# Patient Record
Sex: MALE | Race: ASIAN | NOT HISPANIC OR LATINO | URBAN - METROPOLITAN AREA
[De-identification: names, ages, dates, MRNs, and addresses within clinical notes are randomized per-mention and may not be internally consistent; named-entity substitution may affect disease eponyms.]

---

## 2020-06-17 ENCOUNTER — OFFICE VISIT - HEALTHEAST (OUTPATIENT)
Dept: FAMILY MEDICINE | Facility: CLINIC | Age: 20
End: 2020-06-17

## 2020-06-17 DIAGNOSIS — Z00.00 ENCOUNTER FOR ANNUAL PHYSICAL EXAM: ICD-10-CM

## 2020-06-17 DIAGNOSIS — F32.0 MILD MAJOR DEPRESSION (H): ICD-10-CM

## 2020-06-17 DIAGNOSIS — Z01.818 ENCOUNTER FOR PREOPERATIVE EXAMINATION FOR GENERAL SURGICAL PROCEDURE: ICD-10-CM

## 2020-06-17 DIAGNOSIS — Z11.1 SCREENING FOR TUBERCULOSIS: ICD-10-CM

## 2020-06-17 DIAGNOSIS — D68.00 VON WILLEBRAND DISEASE (H): ICD-10-CM

## 2020-06-17 LAB
ALBUMIN SERPL-MCNC: 4.4 G/DL (ref 3.5–5)
ALP SERPL-CCNC: 110 U/L (ref 45–120)
ALT SERPL W P-5'-P-CCNC: 37 U/L (ref 0–45)
ANION GAP SERPL CALCULATED.3IONS-SCNC: 12 MMOL/L (ref 5–18)
AST SERPL W P-5'-P-CCNC: 24 U/L (ref 0–40)
BASOPHILS # BLD AUTO: 0 THOU/UL (ref 0–0.2)
BASOPHILS NFR BLD AUTO: 1 % (ref 0–2)
BILIRUB SERPL-MCNC: 0.7 MG/DL (ref 0–1)
BUN SERPL-MCNC: 9 MG/DL (ref 8–22)
CALCIUM SERPL-MCNC: 9.4 MG/DL (ref 8.5–10.5)
CHLORIDE BLD-SCNC: 101 MMOL/L (ref 98–107)
CHOLEST SERPL-MCNC: 202 MG/DL
CO2 SERPL-SCNC: 27 MMOL/L (ref 22–31)
CREAT SERPL-MCNC: 0.82 MG/DL (ref 0.7–1.3)
EOSINOPHIL # BLD AUTO: 0.1 THOU/UL (ref 0–0.4)
EOSINOPHIL NFR BLD AUTO: 3 % (ref 0–6)
ERYTHROCYTE [DISTWIDTH] IN BLOOD BY AUTOMATED COUNT: 11.4 % (ref 11–14.5)
FASTING STATUS PATIENT QL REPORTED: YES
GFR SERPL CREATININE-BSD FRML MDRD: >60 ML/MIN/1.73M2
GLUCOSE BLD-MCNC: 88 MG/DL (ref 70–125)
HCT VFR BLD AUTO: 46.2 % (ref 40–54)
HDLC SERPL-MCNC: 60 MG/DL
HGB BLD-MCNC: 16 G/DL (ref 14–18)
HIV 1+2 AB+HIV1 P24 AG SERPL QL IA: NEGATIVE
LDLC SERPL CALC-MCNC: 126 MG/DL
LYMPHOCYTES # BLD AUTO: 3 THOU/UL (ref 0.8–4.4)
LYMPHOCYTES NFR BLD AUTO: 53 % (ref 20–40)
MCH RBC QN AUTO: 33.2 PG (ref 27–34)
MCHC RBC AUTO-ENTMCNC: 34.6 G/DL (ref 32–36)
MCV RBC AUTO: 96 FL (ref 80–100)
MONOCYTES # BLD AUTO: 0.3 THOU/UL (ref 0–0.9)
MONOCYTES NFR BLD AUTO: 5 % (ref 2–10)
NEUTROPHILS # BLD AUTO: 2.2 THOU/UL (ref 2–7.7)
NEUTROPHILS NFR BLD AUTO: 39 % (ref 50–70)
PLATELET # BLD AUTO: 161 THOU/UL (ref 140–440)
PMV BLD AUTO: 8.1 FL (ref 7–10)
POTASSIUM BLD-SCNC: 4 MMOL/L (ref 3.5–5)
PROT SERPL-MCNC: 7.2 G/DL (ref 6–8)
RBC # BLD AUTO: 4.82 MILL/UL (ref 4.4–6.2)
SODIUM SERPL-SCNC: 140 MMOL/L (ref 136–145)
TRIGL SERPL-MCNC: 79 MG/DL
WBC: 5.7 THOU/UL (ref 4–11)

## 2020-06-17 ASSESSMENT — ANXIETY QUESTIONNAIRES
1. FEELING NERVOUS, ANXIOUS, OR ON EDGE: SEVERAL DAYS
5. BEING SO RESTLESS THAT IT IS HARD TO SIT STILL: NOT AT ALL
GAD7 TOTAL SCORE: 4
7. FEELING AFRAID AS IF SOMETHING AWFUL MIGHT HAPPEN: SEVERAL DAYS
6. BECOMING EASILY ANNOYED OR IRRITABLE: SEVERAL DAYS
IF YOU CHECKED OFF ANY PROBLEMS ON THIS QUESTIONNAIRE, HOW DIFFICULT HAVE THESE PROBLEMS MADE IT FOR YOU TO DO YOUR WORK, TAKE CARE OF THINGS AT HOME, OR GET ALONG WITH OTHER PEOPLE: NOT DIFFICULT AT ALL
2. NOT BEING ABLE TO STOP OR CONTROL WORRYING: NOT AT ALL
3. WORRYING TOO MUCH ABOUT DIFFERENT THINGS: SEVERAL DAYS
4. TROUBLE RELAXING: NOT AT ALL

## 2020-06-17 ASSESSMENT — PATIENT HEALTH QUESTIONNAIRE - PHQ9: SUM OF ALL RESPONSES TO PHQ QUESTIONS 1-9: 8

## 2020-06-17 ASSESSMENT — MIFFLIN-ST. JEOR: SCORE: 1785.52

## 2020-06-19 LAB
GAMMA INTERFERON BACKGROUND BLD IA-ACNC: 0.02 IU/ML
M TB IFN-G BLD-IMP: NEGATIVE
MITOGEN IGNF BCKGRD COR BLD-ACNC: -0.01 IU/ML
MITOGEN IGNF BCKGRD COR BLD-ACNC: 0 IU/ML
QTF INTERPRETATION: NORMAL
QTF MITOGEN - NIL: 9.58 IU/ML

## 2020-06-22 ENCOUNTER — COMMUNICATION - HEALTHEAST (OUTPATIENT)
Dept: FAMILY MEDICINE | Facility: CLINIC | Age: 20
End: 2020-06-22

## 2020-06-22 ENCOUNTER — COMMUNICATION - HEALTHEAST (OUTPATIENT)
Dept: SCHEDULING | Facility: CLINIC | Age: 20
End: 2020-06-22

## 2021-05-27 ASSESSMENT — PATIENT HEALTH QUESTIONNAIRE - PHQ9: SUM OF ALL RESPONSES TO PHQ QUESTIONS 1-9: 8

## 2021-05-28 ASSESSMENT — ANXIETY QUESTIONNAIRES: GAD7 TOTAL SCORE: 4

## 2021-06-04 VITALS
SYSTOLIC BLOOD PRESSURE: 111 MMHG | HEIGHT: 73 IN | DIASTOLIC BLOOD PRESSURE: 79 MMHG | HEART RATE: 73 BPM | WEIGHT: 162.4 LBS | BODY MASS INDEX: 21.52 KG/M2 | TEMPERATURE: 97.5 F | OXYGEN SATURATION: 98 %

## 2021-06-08 NOTE — PROGRESS NOTES
Preoperative Exam    Scheduled Procedure: Bilateral ICL eye surgery   Surgery Date:  06/30/2020  Surgery Location: [facility]- FAX# [phone]    Surgeon:  Dr. Neves     Assessment/Plan:     Benton was seen today for pre-op exam.    Encounter for preoperative examination for general surgical procedure  Clear for surgery  -     Comprehensive Metabolic Panel  -     HM1(CBC and Differential)  -     HM1 (CBC with Diff)      PROBLEMS ADDRESSED TODAY THAT ARE UNRELATED TO SURGERY:  Encounter for annual physical exam  Updated immunization. Fasting lab work done.   -     Comprehensive Metabolic Panel  -     Lipid [place] FASTING  -     HIV Antigen/Antibody Screening [place]    Von Willebrand disease (H)  H/o nosebleds and easy bruising. Hasn't been an issue for him in the last few years  -     HM1(CBC and Differential)  -     HM1 (CBC with Diff)    Mild major depression (H)  ADIEL-7 score is 4 andPHQ-9 score is 8. Was on wellbutrin and prozac previously when he was in high school. He feels good now. Not on any medication currently. Continue to monitor    Screening for tuberculosis  Needed for school.   -     QTF-Mycobacterium tuberculosis by QuantiFERON-TB Gold Plus    Other orders  -     Tdap vaccine,  8yo or older,  IM      Surgical Procedure Risk: Low (reported cardiac risk generally < 1%)  Have you had prior anesthesia?: Yes  Have you or any family members had a previous anesthesia reaction:  No  Do you or any family members have a history of a clotting or bleeding disorder?: Yes: patient has VonWillebrand disease. Has had major ankle surgery a few years ago without any issue.   Cardiac Risk Assessment: no increased risk for major cardiac complications    APPROVAL GIVEN to proceed with proposed procedure, without further diagnostic evaluation      Functional Status: Independent  Patient plans to recover at home with family.     Subjective:      Benton Starr is a 20 y.o. male who presents for a preoperative  consultation.  With be undergoing ICL surgery bilateral eyes. No concern. Has been feeling well. Patient is new to me and is also here for annual physical prior to going back to school so will need immunization updated and lab work done.   Has had vonwillebrand disease and undergone ankle surgery without complications. Had easy bruising and nosebleeds when he was younger but hasn't had bleeding issue for the last few years.     All other systems reviewed and are negative, other than those listed in the HPI.    Pertinent History  Do you have difficulty breathing or chest pain after walking up a flight of stairs: No  History of obstructive sleep apnea: No  Steroid use in the last 6 months: No  Frequent Aspirin/NSAID use: No  Prior Blood Transfusion: Yes: Von willebrand disease.   Prior Blood Transfusion Reaction: No  If for some reason prior to, during or after the procedure, if it is medically indicated, would you be willing to have a blood transfusion?:  There is no transfusion refusal.    No current outpatient medications on file.     No current facility-administered medications for this visit.         Allergies   Allergen Reactions     Amoxicillin        Patient Active Problem List   Diagnosis     Von Willebrand disease (H)     Mild major depression (H)       Past Medical History:   Diagnosis Date     Von Willebrand disease (H)        Past Surgical History:   Procedure Laterality Date     ANKLE SURGERY Left        Social History     Socioeconomic History     Marital status: Single     Spouse name: Not on file     Number of children: Not on file     Years of education: Not on file     Highest education level: Not on file   Occupational History     Not on file   Social Needs     Financial resource strain: Not on file     Food insecurity     Worry: Not on file     Inability: Not on file     Transportation needs     Medical: Not on file     Non-medical: Not on file   Tobacco Use     Smoking status: Former Smoker      "Smokeless tobacco: Current User     Tobacco comment: vaping   Substance and Sexual Activity     Alcohol use: Yes     Frequency: Monthly or less     Drug use: Not Currently     Sexual activity: Yes     Partners: Female     Birth control/protection: Condom   Lifestyle     Physical activity     Days per week: Not on file     Minutes per session: Not on file     Stress: Not on file   Relationships     Social connections     Talks on phone: Not on file     Gets together: Not on file     Attends Tenriism service: Not on file     Active member of club or organization: Not on file     Attends meetings of clubs or organizations: Not on file     Relationship status: Not on file     Intimate partner violence     Fear of current or ex partner: Not on file     Emotionally abused: Not on file     Physically abused: Not on file     Forced sexual activity: Not on file   Other Topics Concern     Not on file   Social History Narrative    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     Adopted from [place]. Has 5 siblings in total.        Patient Care Team:  Dotty Oseguera MD as PCP - General (Family Medicine)      Objective:     Vitals:    06/17/20 0935   BP: 111/79   Pulse: 73   Temp: 97.5  F (36.4  C)   TempSrc: Oral   SpO2: 98%   Weight: 162 lb 6.4 oz (73.7 kg)   Height: 6' 1\" (1.854 m)       Physical Exam:  General Appearance: Alert, cooperative, no distress, appears stated age  Head: Normocephalic, without obvious abnormality, atraumatic  Eyes: PERRL, conjunctiva/corneas clear, EOM's intact  Neck: Supple, symmetrical, trachea midline, no adenopathy;  thyroid: not enlarged, symmetric, no tenderness/mass/nodules  Back: Symmetric, no curvature, ROM normal  Lungs: Clear to auscultation bilaterally, respirations unlabored  Heart: Regular rate and rhythm, S1 and S2 normal, no murmur rub or gallop  Abdomen: Soft, non-tender, bowel sounds active all four quadrants,  no masses, no organomegaly  Genitourinary: " Not performed  Musculoskeletal: Normal range of motion.    Extremities: Extremities normal, atraumatic, no cyanosis,   Skin: Limited skin examination is unremarkable  Lymph nodes: Cervical, supraclavicular, and axillary nodes normal  Neurologic: alert, has normal reflexes.  answers questions appropriately, sensation intact throughout.   Psychiatric: normal mood and affect.         Patient Instructions      Before Your Surgery       Call your surgeon if there is any change in your health. This includes signs of a cold or flu (such as a sore throat, runny nose, cough, rash or fever).       Do not smoke, drink alcohol or take over the counter medicine (unless your surgeon or primary care doctor tells you to) for the 24 hours before and after surgery.       If you take prescribed drugs: Follow your doctor s orders about which medicines to take and which to stop until after surgery.      Eating and drinking prior to surgery: follow the instructions from your surgeon.      Take a shower or bath the night before surgery. Use the soap your surgeon gave you to gently clean your skin. If you do not have soap from your surgeon, use your regular soap. Do not shave or scrub the surgery site. Wear clean pajamas and have clean sheets on your bed.             Hold all supplements, aspirin and NSAIDs for 7 days prior to surgery.    Follow your surgeon's direction on when to stop eating and drinking prior to surgery.    Your surgeon will be managing your pain after your surgery.        EKG:  Not indicated.     Labs:  Labs pending at this time.  Results will be reviewed when available.    Immunization History   Administered Date(s) Administered     Dtap 1998, 1998, 1998, 07/15/1999, 05/06/2003     HPV 9 Valent 05/04/2016     HPV Quadrivalent 08/23/2012, 01/23/2015     Hep A, Adult IM (19yr & older) 05/03/2017     Hep B, Peds or Adolescent 1998, 1998, 10/15/1999     Hepatitis A, Ped/Adol 2 Dose IM (18yr &  under) 05/04/2016     Hib (PRP-T) 1998, 07/15/1999, 11/03/1999, 12/16/1999     IPV 08/22/1999, 10/13/1999, 05/06/2003     Influenza, Live, Nasal LAIV3 10/01/2009, 10/27/2010, 09/29/2011     Influenza, Seasonal, Inj PF IIV3 10/09/2007, 11/01/2015, 12/13/2016     Influenza,inj,MDCK,PF,Quad >4yrs 12/30/2017     Influenza,seasonal quad, PF, =/> 6months 12/20/2019     Influenza,seasonal, Inj IIV3 10/29/2002, 10/28/2003, 11/09/2006, 10/26/2013     MMR 04/16/1999, 04/22/2002     Meningococcal MCV4O 01/23/2015     Meningococcal MCV4P 10/22/2009     OPV,Trivalent,Historic(6419-2450 only) 1998, 07/15/1999     Tdap 10/22/2009, 06/17/2020     Varicella 04/16/1999, 06/13/2007         Electronically signed by Dotty Oseguera MD 06/17/20 9:32 AM

## 2021-06-09 NOTE — TELEPHONE ENCOUNTER
----- Message from Dotty Oseguera MD sent at 6/20/2020 10:59 PM CDT -----  Please let patient know result:  Lab looks good    Please mail result letter.

## 2021-06-09 NOTE — TELEPHONE ENCOUNTER
Pt notified at 1140 am, informed Pt that forms are completed and can be picked up at the . Screen done  NadyaCY VanceA

## 2021-06-09 NOTE — TELEPHONE ENCOUNTER
VALENTIN for Pt at 944 am, upon return call please relay Dr Oseguera's message.  Labs results printed and mailed to  address.  CY AguilarA

## 2021-06-09 NOTE — TELEPHONE ENCOUNTER
Who is calling:  Patient  Reason for Call:  The patient left school forms when was in for the pre-op on 6/17/20.  Patient would like to know if they are ready to be picked up.Please advise patient of status.  Date of last appointment with primary care: 6/17/20  Okay to leave a detailed message: Yes

## 2021-06-09 NOTE — TELEPHONE ENCOUNTER
Pt notified at 459 pm, informed him that forms are in Dr Oseguera's basket to be completed and that she will be in on 6/24/20. Once forms are completed please call Pt so he can .  CY AguilarA

## 2021-06-16 PROBLEM — F32.0 MILD MAJOR DEPRESSION (H): Status: ACTIVE | Noted: 2020-06-17

## 2021-06-20 NOTE — LETTER
Letter by Dotty Oseguera MD at      Author: Dotty Oseguera MD Service: -- Author Type: --    Filed:  Encounter Date: 6/22/2020 Status: (Other)         Benton Starr  [address] [facility] [address]             June 22, 2020         Dear Mr. Starr,    Below are the results from your recent visit:    Resulted Orders   QTF-Mycobacterium tuberculosis by QuantiFERON-TB Gold Plus   Result Value Ref Range    QTF RESULT Negative Negative    QTF INTREPRETATION       No interferon-gamma response to M. tuberculosis antigens was detected.  Infecton with M. tuberculosis is unlikely.  A negative result alone does not exclude infection with M. tuberculosis    QTF NIL 0.02 IU/mL    QTF ANTIGEN TB1-NIL 0.00 IU/mL    QTF ANTIGEN TB2 - NIL -0.01 IU/mL    QTF MITOGEN-NIL 9.58 IU/mL   Comprehensive Metabolic Panel   Result Value Ref Range    Sodium 140 136 - 145 mmol/L    Potassium 4.0 3.5 - 5.0 mmol/L    Chloride 101 98 - 107 mmol/L    CO2 27 22 - 31 mmol/L    Anion Gap, Calculation 12 5 - 18 mmol/L    Glucose 88 70 - 125 mg/dL    BUN 9 8 - 22 mg/dL    Creatinine 0.82 0.70 - 1.30 mg/dL    GFR MDRD Af Amer >60 >60 mL/min/1.73m2    GFR MDRD Non Af Amer >60 >60 mL/min/1.73m2    Bilirubin, Total 0.7 0.0 - 1.0 mg/dL    Calcium 9.4 8.5 - 10.5 mg/dL    Protein, Total 7.2 6.0 - 8.0 g/dL    Albumin 4.4 3.5 - 5.0 g/dL    Alkaline Phosphatase 110 45 - 120 U/L    AST 24 0 - 40 U/L    ALT 37 0 - 45 U/L    Narrative    Fasting Glucose reference range is 70-99 mg/dL per  American Diabetes Association (ADA) guidelines.   Lipid [place] FASTING   Result Value Ref Range    Cholesterol 202 (H) <=199 mg/dL    Triglycerides 79 <=149 mg/dL    HDL Cholesterol 60 >=40 mg/dL    LDL Calculated 126 <=129 mg/dL    Patient Fasting > 8hrs? Yes    HM1 (CBC with Diff)   Result Value Ref Range    WBC 5.7 4.0 - 11.0 thou/uL    RBC 4.82 4.40 - 6.20 mill/uL    Hemoglobin 16.0 14.0 - 18.0 g/dL    Hematocrit 46.2 40.0 - 54.0 %    MCV 96 80 - 100 fL    MCH 33.2  27.0 - 34.0 pg    MCHC 34.6 32.0 - 36.0 g/dL    RDW 11.4 11.0 - 14.5 %    Platelets 161 140 - 440 thou/uL    MPV 8.1 7.0 - 10.0 fL    Neutrophils % 39 (L) 50 - 70 %    Lymphocytes % 53 (H) 20 - 40 %    Monocytes % 5 2 - 10 %    Eosinophils % 3 0 - 6 %    Basophils % 1 0 - 2 %    Neutrophils Absolute 2.2 2.0 - 7.7 thou/uL    Lymphocytes Absolute 3.0 0.8 - 4.4 thou/uL    Monocytes Absolute 0.3 0.0 - 0.9 thou/uL    Eosinophils Absolute 0.1 0.0 - 0.4 thou/uL    Basophils Absolute 0.0 0.0 - 0.2 thou/uL   HIV Antigen/Antibody Screening [facility]   Result Value Ref Range    HIV Antigen / Antibody Negative Negative    Narrative    Method is Abbott HIV Ag/Ab for the detection of HIV p24 antigen, HIV-1 antibodies and HIV-2 antibodies.         Labs all look good       Please call with questions or contact us using [email or web address].    Sincerely,        Electronically signed by Dotty Oseguera MD

## 2021-08-07 ENCOUNTER — HEALTH MAINTENANCE LETTER (OUTPATIENT)
Age: 21
End: 2021-08-07

## 2021-10-02 ENCOUNTER — HEALTH MAINTENANCE LETTER (OUTPATIENT)
Age: 21
End: 2021-10-02

## 2021-10-19 PROBLEM — F32.9 MAJOR DEPRESSION: Status: ACTIVE | Noted: 2020-06-17

## 2022-09-03 ENCOUNTER — HEALTH MAINTENANCE LETTER (OUTPATIENT)
Age: 22
End: 2022-09-03

## 2023-09-30 ENCOUNTER — HEALTH MAINTENANCE LETTER (OUTPATIENT)
Age: 23
End: 2023-09-30